# Patient Record
Sex: MALE | Race: BLACK OR AFRICAN AMERICAN | NOT HISPANIC OR LATINO | URBAN - METROPOLITAN AREA
[De-identification: names, ages, dates, MRNs, and addresses within clinical notes are randomized per-mention and may not be internally consistent; named-entity substitution may affect disease eponyms.]

---

## 2019-09-27 ENCOUNTER — EMERGENCY (EMERGENCY)
Facility: HOSPITAL | Age: 39
LOS: 1 days | Discharge: HOME | End: 2019-09-27
Attending: EMERGENCY MEDICINE
Payer: MEDICAID

## 2019-09-27 VITALS
DIASTOLIC BLOOD PRESSURE: 80 MMHG | RESPIRATION RATE: 17 BRPM | HEART RATE: 69 BPM | TEMPERATURE: 98 F | HEIGHT: 73 IN | SYSTOLIC BLOOD PRESSURE: 122 MMHG | OXYGEN SATURATION: 97 %

## 2019-09-27 LAB
HCT VFR BLD CALC: 43.1 % — SIGNIFICANT CHANGE UP (ref 39–50)
HGB BLD-MCNC: 14.4 G/DL — SIGNIFICANT CHANGE UP (ref 13–17)
MCHC RBC-ENTMCNC: 29.7 PG — SIGNIFICANT CHANGE UP (ref 27–34)
MCHC RBC-ENTMCNC: 33.3 GM/DL — SIGNIFICANT CHANGE UP (ref 32–36)
MCV RBC AUTO: 89 FL — SIGNIFICANT CHANGE UP (ref 80–100)
OB PNL STL: POSITIVE
PLATELET # BLD AUTO: 176 K/UL — SIGNIFICANT CHANGE UP (ref 150–400)
RBC # BLD: 4.85 M/UL — SIGNIFICANT CHANGE UP (ref 4.2–5.8)
RBC # FLD: 12.9 % — SIGNIFICANT CHANGE UP (ref 10.3–14.5)
WBC # BLD: 9.1 K/UL — SIGNIFICANT CHANGE UP (ref 3.8–10.5)
WBC # FLD AUTO: 9.1 K/UL — SIGNIFICANT CHANGE UP (ref 3.8–10.5)

## 2019-09-27 PROCEDURE — 93010 ELECTROCARDIOGRAM REPORT: CPT

## 2019-09-27 PROCEDURE — 99284 EMERGENCY DEPT VISIT MOD MDM: CPT

## 2019-09-27 RX ORDER — SODIUM CHLORIDE 9 MG/ML
500 INJECTION INTRAMUSCULAR; INTRAVENOUS; SUBCUTANEOUS ONCE
Refills: 0 | Status: COMPLETED | OUTPATIENT
Start: 2019-09-27 | End: 2019-09-27

## 2019-09-27 RX ADMIN — SODIUM CHLORIDE 500 MILLILITER(S): 9 INJECTION INTRAMUSCULAR; INTRAVENOUS; SUBCUTANEOUS at 23:32

## 2019-09-27 NOTE — ED PROVIDER NOTE - PLAN OF CARE
39 y M otherwise healthy c 2-3 weeks 4 episodes (intermittent) bloody stools, noted to have intermittent diarrhea and firm stools c brb noted (within?) stool and in bowl, no clots.  -CBC, CMP  -IVF  -FOBT  -Xray Abdomen

## 2019-09-27 NOTE — ED PROVIDER NOTE - PATIENT PORTAL LINK FT
You can access the FollowMyHealth Patient Portal offered by [facility] by registering at the following website: [email or web address]. By joining [facility]’s FollowMyHealth portal, you will also be able to view your health information using other applications (apps) compatible with our system.

## 2019-09-27 NOTE — ED PROVIDER NOTE - NSFOLLOWUPINSTRUCTIONS_ED_ALL_ED_FT
Please return to Emergency Department immediately for any new, concerning, or worsening symptoms.   Please follow-up with your primary care physician and a gastroenterologist.

## 2019-09-27 NOTE — ED PROVIDER NOTE - OBJECTIVE STATEMENT
39 y M otherwise healthy c 2-3 weeks 4 episodes (intermittent) bloody stools, noted to have intermittent diarrhea and firm stools c brb noted (within?) stool and in bowl, no clots.  Has some pain with defecation with stools recently regardless of whether bloody or not.  Had some lightheadedness yesterday, most recent hematochezia was today, no lightheadedness associated.  No fc.  No nv.  No back pain.  No rectal pain.  No h/o IBD in pt or family.  No changes in diet, travel.  No other complaints.  No AC.  No drug/smoking/daily ETOH

## 2019-09-27 NOTE — ED PROVIDER NOTE - CLINICAL SUMMARY MEDICAL DECISION MAKING FREE TEXT BOX
Hematochezia, intermittent, a/w abd pain.  Exam and VS are benign.  Pt nontoxic.  Will check labs/ekg/axr.  Assuming no actionable findings will d/c c outpt GI.  --BMM

## 2019-09-27 NOTE — ED PROVIDER NOTE - PHYSICAL EXAMINATION
GENERAL: AAOx4, GCS 15, NAD, WDWN; HEENT: MMM, no jugular venous distension, supple neck, PERRLA, EOMI, nonicteric sclera; PULM: CTA B, no crackles/rubs/rales; CV: RRR, S1S2, no MRG; ABD: Flat abdomen, NTND, no R/G/R, no CVAT.  MSK: BAKER, +2 pulses x4;  NEURO: No obvious focal deficits; PSYCH: AAOx3, clear thought and normal sensorium.

## 2019-09-27 NOTE — ED PROVIDER NOTE - CARE PLAN
Principal Discharge DX:	Hematochezia Principal Discharge DX:	Hematochezia  Assessment and plan of treatment:	39 y M otherwise healthy c 2-3 weeks 4 episodes (intermittent) bloody stools, noted to have intermittent diarrhea and firm stools c brb noted (within?) stool and in bowl, no clots.  -CBC, CMP  -IVF  -FOBT  -Xray Abdomen

## 2019-09-27 NOTE — ED ADULT NURSE NOTE - OBJECTIVE STATEMENT
39 y.o M presents to the ED from home c/o bloody stools. Patient is awake, alert and speaking coherently. As per patient for the past 3 weeks he has had various episodes of bloody stools; patient reports having 1 bloody stool today and it was more blood than usual. Per patient blood is bright red.  Patient reports feeling dizzy after the bowel movement. Patient presents A&Ox3, afebrile and ambulatory; denies chest pain and SOB; abdomen soft, nontender and nondistended, denies n/v/d, denies hematuria and dysuria.

## 2019-09-27 NOTE — ED ADULT NURSE NOTE - NSIMPLEMENTINTERV_GEN_ALL_ED
Implemented All Universal Safety Interventions:  [place] to call system. Call bell, personal items and telephone within reach. Instruct patient to call for assistance. Room bathroom lighting operational. Non-slip footwear when patient is off stretcher. Physically safe environment: no spills, clutter or unnecessary equipment. Stretcher in lowest position, wheels locked, appropriate side rails in place.

## 2019-09-27 NOTE — ED PROVIDER NOTE - NSFOLLOWUPCLINICS_GEN_ALL_ED_FT
[facility]  Gastroenterology  [address] [place] [address], [facility] [address]  [place] [address]  Phone: [phone]  Fax:     A Gastroenterologist  Gastroenterology  .  [place]   Phone:   Fax:   Follow Up Time:

## 2019-09-28 VITALS
HEART RATE: 64 BPM | TEMPERATURE: 98 F | SYSTOLIC BLOOD PRESSURE: 118 MMHG | DIASTOLIC BLOOD PRESSURE: 87 MMHG | OXYGEN SATURATION: 98 % | RESPIRATION RATE: 16 BRPM

## 2019-09-28 LAB
ALBUMIN SERPL ELPH-MCNC: 4.4 G/DL — SIGNIFICANT CHANGE UP (ref 3.3–5)
ALP SERPL-CCNC: 55 U/L — SIGNIFICANT CHANGE UP (ref 40–120)
ALT FLD-CCNC: 34 U/L — SIGNIFICANT CHANGE UP (ref 10–45)
ANION GAP SERPL CALC-SCNC: 9 MMOL/L — SIGNIFICANT CHANGE UP (ref 5–17)
APTT BLD: 30.5 SEC — SIGNIFICANT CHANGE UP (ref 27.5–36.3)
AST SERPL-CCNC: 44 U/L — HIGH (ref 10–40)
BILIRUB SERPL-MCNC: 0.4 MG/DL — SIGNIFICANT CHANGE UP (ref 0.2–1.2)
BUN SERPL-MCNC: 13 MG/DL — SIGNIFICANT CHANGE UP (ref 7–23)
CALCIUM SERPL-MCNC: 9.5 MG/DL — SIGNIFICANT CHANGE UP (ref 8.4–10.5)
CHLORIDE SERPL-SCNC: 103 MMOL/L — SIGNIFICANT CHANGE UP (ref 96–108)
CO2 SERPL-SCNC: 28 MMOL/L — SIGNIFICANT CHANGE UP (ref 22–31)
CREAT SERPL-MCNC: 0.89 MG/DL — SIGNIFICANT CHANGE UP (ref 0.5–1.3)
GLUCOSE SERPL-MCNC: 101 MG/DL — HIGH (ref 70–99)
INR BLD: 1.06 RATIO — SIGNIFICANT CHANGE UP (ref 0.88–1.16)
POTASSIUM SERPL-MCNC: 4 MMOL/L — SIGNIFICANT CHANGE UP (ref 3.5–5.3)
POTASSIUM SERPL-SCNC: 4 MMOL/L — SIGNIFICANT CHANGE UP (ref 3.5–5.3)
PROT SERPL-MCNC: 7.6 G/DL — SIGNIFICANT CHANGE UP (ref 6–8.3)
PROTHROM AB SERPL-ACNC: 12.1 SEC — SIGNIFICANT CHANGE UP (ref 10–12.9)
SODIUM SERPL-SCNC: 140 MMOL/L — SIGNIFICANT CHANGE UP (ref 135–145)

## 2019-09-28 PROCEDURE — 85730 THROMBOPLASTIN TIME PARTIAL: CPT

## 2019-09-28 PROCEDURE — 80053 COMPREHEN METABOLIC PANEL: CPT

## 2019-09-28 PROCEDURE — 93005 ELECTROCARDIOGRAM TRACING: CPT

## 2019-09-28 PROCEDURE — 82272 OCCULT BLD FECES 1-3 TESTS: CPT

## 2019-09-28 PROCEDURE — 85610 PROTHROMBIN TIME: CPT

## 2019-09-28 PROCEDURE — 74019 RADEX ABDOMEN 2 VIEWS: CPT | Mod: 26

## 2019-09-28 PROCEDURE — 74177 CT ABD & PELVIS W/CONTRAST: CPT

## 2019-09-28 PROCEDURE — 85027 COMPLETE CBC AUTOMATED: CPT

## 2019-09-28 PROCEDURE — 74177 CT ABD & PELVIS W/CONTRAST: CPT | Mod: 26

## 2019-09-28 PROCEDURE — 99284 EMERGENCY DEPT VISIT MOD MDM: CPT

## 2019-09-28 PROCEDURE — 74019 RADEX ABDOMEN 2 VIEWS: CPT

## 2021-04-10 NOTE — ED PROVIDER NOTE - CHILD ABUSE FACILITY
Clear bilaterally, pupils equal, round and reactive to light.
[facility]